# Patient Record
Sex: MALE | Race: WHITE | NOT HISPANIC OR LATINO | Employment: FULL TIME | URBAN - METROPOLITAN AREA
[De-identification: names, ages, dates, MRNs, and addresses within clinical notes are randomized per-mention and may not be internally consistent; named-entity substitution may affect disease eponyms.]

---

## 2024-09-23 ENCOUNTER — OFFICE VISIT (OUTPATIENT)
Dept: FAMILY MEDICINE | Facility: CLINIC | Age: 39
End: 2024-09-23
Payer: COMMERCIAL

## 2024-09-23 VITALS
SYSTOLIC BLOOD PRESSURE: 114 MMHG | WEIGHT: 128.5 LBS | BODY MASS INDEX: 20.65 KG/M2 | TEMPERATURE: 98.3 F | OXYGEN SATURATION: 99 % | HEIGHT: 66 IN | HEART RATE: 66 BPM | DIASTOLIC BLOOD PRESSURE: 80 MMHG | RESPIRATION RATE: 16 BRPM

## 2024-09-23 DIAGNOSIS — K52.9 CHRONIC DIARRHEA: Primary | ICD-10-CM

## 2024-09-23 DIAGNOSIS — M25.561 RIGHT KNEE PAIN, UNSPECIFIED CHRONICITY: ICD-10-CM

## 2024-09-23 LAB
ALBUMIN SERPL BCG-MCNC: 4.6 G/DL (ref 3.5–5.2)
ALP SERPL-CCNC: 83 U/L (ref 40–150)
ALT SERPL W P-5'-P-CCNC: 27 U/L (ref 0–70)
ANION GAP SERPL CALCULATED.3IONS-SCNC: 10 MMOL/L (ref 7–15)
AST SERPL W P-5'-P-CCNC: 25 U/L (ref 0–45)
BILIRUB SERPL-MCNC: 0.8 MG/DL
BUN SERPL-MCNC: 13.9 MG/DL (ref 6–20)
CALCIUM SERPL-MCNC: 9.9 MG/DL (ref 8.8–10.4)
CHLORIDE SERPL-SCNC: 103 MMOL/L (ref 98–107)
CREAT SERPL-MCNC: 0.9 MG/DL (ref 0.67–1.17)
EGFRCR SERPLBLD CKD-EPI 2021: >90 ML/MIN/1.73M2
ERYTHROCYTE [DISTWIDTH] IN BLOOD BY AUTOMATED COUNT: 12.8 % (ref 10–15)
GLUCOSE SERPL-MCNC: 91 MG/DL (ref 70–99)
HCO3 SERPL-SCNC: 29 MMOL/L (ref 22–29)
HCT VFR BLD AUTO: 44.2 % (ref 40–53)
HGB BLD-MCNC: 14.9 G/DL (ref 13.3–17.7)
LIPASE SERPL-CCNC: 29 U/L (ref 13–60)
MCH RBC QN AUTO: 29.2 PG (ref 26.5–33)
MCHC RBC AUTO-ENTMCNC: 33.7 G/DL (ref 31.5–36.5)
MCV RBC AUTO: 87 FL (ref 78–100)
PLATELET # BLD AUTO: 210 10E3/UL (ref 150–450)
POTASSIUM SERPL-SCNC: 4.1 MMOL/L (ref 3.4–5.3)
PROT SERPL-MCNC: 7.8 G/DL (ref 6.4–8.3)
RBC # BLD AUTO: 5.1 10E6/UL (ref 4.4–5.9)
SODIUM SERPL-SCNC: 142 MMOL/L (ref 135–145)
TSH SERPL DL<=0.005 MIU/L-ACNC: 1.42 UIU/ML (ref 0.3–4.2)
WBC # BLD AUTO: 5.2 10E3/UL (ref 4–11)

## 2024-09-23 PROCEDURE — 85027 COMPLETE CBC AUTOMATED: CPT | Performed by: FAMILY MEDICINE

## 2024-09-23 PROCEDURE — 83690 ASSAY OF LIPASE: CPT | Performed by: FAMILY MEDICINE

## 2024-09-23 PROCEDURE — 80053 COMPREHEN METABOLIC PANEL: CPT | Performed by: FAMILY MEDICINE

## 2024-09-23 PROCEDURE — 86364 TISS TRNSGLTMNASE EA IG CLAS: CPT | Performed by: FAMILY MEDICINE

## 2024-09-23 PROCEDURE — 83993 ASSAY FOR CALPROTECTIN FECAL: CPT | Performed by: FAMILY MEDICINE

## 2024-09-23 PROCEDURE — 84443 ASSAY THYROID STIM HORMONE: CPT | Performed by: FAMILY MEDICINE

## 2024-09-23 PROCEDURE — 36415 COLL VENOUS BLD VENIPUNCTURE: CPT | Performed by: FAMILY MEDICINE

## 2024-09-23 PROCEDURE — 99214 OFFICE O/P EST MOD 30 MIN: CPT | Performed by: FAMILY MEDICINE

## 2024-09-23 RX ORDER — DEXTROAMPHETAMINE SACCHARATE, AMPHETAMINE ASPARTATE MONOHYDRATE, DEXTROAMPHETAMINE SULFATE AND AMPHETAMINE SULFATE 3.75; 3.75; 3.75; 3.75 MG/1; MG/1; MG/1; MG/1
15 CAPSULE, EXTENDED RELEASE ORAL DAILY
COMMUNITY
Start: 2024-07-07

## 2024-09-23 ASSESSMENT — PAIN SCALES - GENERAL: PAINLEVEL: NO PAIN (0)

## 2024-09-23 ASSESSMENT — ENCOUNTER SYMPTOMS: DIARRHEA: 1

## 2024-09-23 NOTE — LETTER
September 24, 2024      Isaac Odonnell  [address]  [place] [facility] [address]        Dear ,    We are writing to inform you of your test results.    Isaac,   So far the lab work all looks normal.  I did run the test for gluten intolerance and that is still in the works.  And the only other thought would be to test that stool sample but we should see the gluten test here within the next day or 2.     Resulted Orders   CBC with platelets   Result Value Ref Range    WBC Count 5.2 4.0 - 11.0 10e3/uL    RBC Count 5.10 4.40 - 5.90 10e6/uL    Hemoglobin 14.9 13.3 - 17.7 g/dL    Hematocrit 44.2 40.0 - 53.0 %    MCV 87 78 - 100 fL    MCH 29.2 26.5 - 33.0 pg    MCHC 33.7 31.5 - 36.5 g/dL    RDW 12.8 10.0 - 15.0 %    Platelet Count 210 150 - 450 10e3/uL   TSH with free T4 reflex   Result Value Ref Range    TSH 1.42 0.30 - 4.20 uIU/mL   Comprehensive metabolic panel   Result Value Ref Range    Sodium 142 135 - 145 mmol/L    Potassium 4.1 3.4 - 5.3 mmol/L    Carbon Dioxide (CO2) 29 22 - 29 mmol/L    Anion Gap 10 7 - 15 mmol/L    Urea Nitrogen 13.9 6.0 - 20.0 mg/dL    Creatinine 0.90 0.67 - 1.17 mg/dL    GFR Estimate >90 >60 mL/min/1.73m2      Comment:      eGFR calculated using 2021 CKD-EPI equation.    Calcium 9.9 8.8 - 10.4 mg/dL      Comment:      Reference intervals for this test were updated on 7/16/2024 to reflect our healthy population more accurately. There may be differences in the flagging of prior results with similar values performed with this method. Those prior results can be interpreted in the context of the updated reference intervals.    Chloride 103 98 - 107 mmol/L    Glucose 91 70 - 99 mg/dL    Alkaline Phosphatase 83 40 - 150 U/L    AST 25 0 - 45 U/L    ALT 27 0 - 70 U/L    Protein Total 7.8 6.4 - 8.3 g/dL    Albumin 4.6 3.5 - 5.2 g/dL    Bilirubin Total 0.8 <=1.2 mg/dL   Lipase   Result Value Ref Range    Lipase 29 13 - 60 U/L       If you have any questions or concerns, please call the clinic  at the number listed above.       Sincerely,      Lindsay Thompson MD

## 2024-09-23 NOTE — PROGRESS NOTES
Assessment & Plan     Chronic diarrhea  My first visit with patient previous history reviewed with him and in his chart.  Has had diarrhea more or less for about 6 months without any preceding illness etc.  There are a few times that stool can be a bit more firm but most the time it is either watery or watery mixed with a little bit of firmness.  Not having a lot of abdominal cramping.  No illness symptoms.  No pain per se.  Cannot pinpoint any particular foods that seem to trigger it off.  Family history is significant for father having what sounds like hyperplastic colon polyps but no significant cancer or inflammatory bowel disease history.  Constitutional symptoms stable.  So we discussed taking a look at some potential metabolic causes with some lab work.  Consideration for a fecal calprotectin.  Otherwise this may fit into IBS-D or similar.  I will contact him with results.  - CBC with platelets; Future  - TSH with free T4 reflex; Future  - Comprehensive metabolic panel; Future  - Tissue transglutaminase fabi IgA and IgG; Future  - Lipase; Future  - Calprotectin Feces; Future  - CBC with platelets  - TSH with free T4 reflex  - Comprehensive metabolic panel  - Tissue transglutaminase fabi IgA and IgG  - Lipase  - Calprotectin Feces    Right knee pain, unspecified chronicity  Also notes for a number of months he has had a bit of pain in behind his right knee periodically.  It seems to be a positional issue such as when he crosses his right leg up or under while sitting.  Playing a lot of pickle ball this summer and that does not seem to bother him.  It is a little bit stiff when he first starts to play but then seems to loosen up.  No swelling.  No instability.  Exam unremarkable.  So I think this is more of a generalized stiffness of some of the internal rotational tendons and I recommended some stretching, etc.            See Patient Instructions    Ovidio Gray is a 38 year old, presenting for the  "following health issues:  Diarrhea (Frequent urination/Always feel dehydrated/Right knee/leg pain)        9/23/2024    12:41 PM   Additional Questions   Roomed by Diane Lynne Schoenherr RN     History of Present Illness       Reason for visit:  Right leg issues / prolonged diarrhea  Symptom onset:  More than a month  Symptoms include:  Pain behind right knee/leg when in certain position.  Symptom intensity:  Moderate  Symptom progression:  Staying the same  Had these symptoms before:  No  What makes it worse:  Sitting cross legged or liting up leg  What makes it better:  Stretxhing out leg   He is taking medications regularly.         Diarrhea  Onset/Duration: 4 months  Description:       Consistency of stool: watery and soft, watery varies  occasional solid bm       Blood in stool: No       Number of loose stools past 24 hours: 2  Progression of Symptoms: same  Accompanying signs and symptoms:       Fever: No       Nausea/Vomiting: No       Abdominal pain: No       Weight loss: No       Episodes of constipation: No  History   Ill contacts: No  Recent use of antibiotics: No  Recent travels: No  Recent medication-new or changes(Rx or OTC): Sertraline 1 month ago; back on Adderall after not being on it  Precipitating or alleviating factors: None  Therapies tried and outcome: Imodium right ear tried times once    Here today for symptoms as above.      Review of Systems  Constitutional, HEENT, cardiovascular, pulmonary, gi and gu systems are negative, except as otherwise noted.      Objective    /80 (BP Location: Right arm, Patient Position: Sitting, Cuff Size: Adult Regular)   Pulse 66   Temp 98.3  F (36.8  C) (Oral)   Resp 16   Ht 1.687 m (5' 6.42\")   Wt 58.3 kg (128 lb 8 oz)   SpO2 99%   BMI 20.48 kg/m    Body mass index is 20.48 kg/m .  Physical Exam   Alert, pleasant, upbeat, and in no apparent discomfort.  S1 and S2 normal, no murmurs, clicks, gallops or rubs. Regular rate and rhythm. Chest is clear; " no wheezes or rales. No edema or JVD.  The abdomen is soft without tenderness, guarding, mass, rebound or organomegaly. Bowel sounds are normal. No CVA tenderness or inguinal adenopathy noted.  Knees reveal full range of motion, no tenderness, masses, effusion or ligamentous instability.   Past labs reviewed with the patient.             Signed Electronically by: Lindsay Thompson MD

## 2024-09-23 NOTE — LETTER
September 24, 2024      Isaac Odonnell  [address]  [place] [facility] [address]        Dear ,    We are writing to inform you of your test results.    Well the gluten test was normal as well.  So I suppose we could consider doing that stool test looking for inflammatory bowel disorders, but I do not know how high yield that is necessarily.  Another thought would be perhaps meeting with GI specialist in consultation just to see what their thoughts are.  Maybe even just like a video visit or something.  So give it some thought and let me know what you think.     Resulted Orders   CBC with platelets   Result Value Ref Range    WBC Count 5.2 4.0 - 11.0 10e3/uL    RBC Count 5.10 4.40 - 5.90 10e6/uL    Hemoglobin 14.9 13.3 - 17.7 g/dL    Hematocrit 44.2 40.0 - 53.0 %    MCV 87 78 - 100 fL    MCH 29.2 26.5 - 33.0 pg    MCHC 33.7 31.5 - 36.5 g/dL    RDW 12.8 10.0 - 15.0 %    Platelet Count 210 150 - 450 10e3/uL   TSH with free T4 reflex   Result Value Ref Range    TSH 1.42 0.30 - 4.20 uIU/mL   Comprehensive metabolic panel   Result Value Ref Range    Sodium 142 135 - 145 mmol/L    Potassium 4.1 3.4 - 5.3 mmol/L    Carbon Dioxide (CO2) 29 22 - 29 mmol/L    Anion Gap 10 7 - 15 mmol/L    Urea Nitrogen 13.9 6.0 - 20.0 mg/dL    Creatinine 0.90 0.67 - 1.17 mg/dL    GFR Estimate >90 >60 mL/min/1.73m2      Comment:      eGFR calculated using 2021 CKD-EPI equation.    Calcium 9.9 8.8 - 10.4 mg/dL      Comment:      Reference intervals for this test were updated on 7/16/2024 to reflect our healthy population more accurately. There may be differences in the flagging of prior results with similar values performed with this method. Those prior results can be interpreted in the context of the updated reference intervals.    Chloride 103 98 - 107 mmol/L    Glucose 91 70 - 99 mg/dL    Alkaline Phosphatase 83 40 - 150 U/L    AST 25 0 - 45 U/L    ALT 27 0 - 70 U/L    Protein Total 7.8 6.4 - 8.3 g/dL    Albumin 4.6 3.5 - 5.2 g/dL     Bilirubin Total 0.8 <=1.2 mg/dL   Tissue transglutaminase fabi IgA and IgG   Result Value Ref Range    Tissue Transglutaminase Antibody IgA 0.6 <7.0 U/mL      Comment:      Negative- The tTG-IgA assay has limited utility for patients with decreased levels of IgA. Screening for celiac disease should include IgA testing to rule out selective IgA deficiency and to guide selection and interpretation of serological testing. tTG-IgG testing may be positive in celiac disease patients with IgA deficiency.    Tissue Transglutaminase Antibody IgG 0.6 <7.0 U/mL      Comment:      Negative   Lipase   Result Value Ref Range    Lipase 29 13 - 60 U/L       If you have any questions or concerns, please call the clinic at the number listed above.       Sincerely,      Lindsay Thompson MD

## 2024-09-24 LAB
TTG IGA SER-ACNC: 0.6 U/ML
TTG IGG SER-ACNC: 0.6 U/ML

## 2024-09-24 NOTE — RESULT ENCOUNTER NOTE
Isaac,  Well the gluten test was normal as well.  So I suppose we could consider doing that stool test looking for inflammatory bowel disorders, but I do not know how high yield that is necessarily.  Another thought would be perhaps meeting with GI specialist in consultation just to see what their thoughts are.  Maybe even just like a video visit or something.  So give it some thought and let me know what you think.  TRINH Thompson M.D.

## 2024-09-24 NOTE — RESULT ENCOUNTER NOTE
Isaac,  So far the lab work all looks normal.  I did run the test for gluten intolerance and that is still in the works.  And the only other thought would be to test that stool sample but we should see the gluten test here within the next day or 2.  TRINH Thompson M.D.

## 2024-09-25 LAB — CALPROTECTIN STL-MCNT: 11.1 MG/KG (ref 0–49.9)

## 2024-10-06 ENCOUNTER — HEALTH MAINTENANCE LETTER (OUTPATIENT)
Age: 39
End: 2024-10-06

## 2024-12-05 ENCOUNTER — MYC MEDICAL ADVICE (OUTPATIENT)
Dept: FAMILY MEDICINE | Facility: CLINIC | Age: 39
End: 2024-12-05
Payer: COMMERCIAL

## 2025-07-15 ENCOUNTER — E-VISIT (OUTPATIENT)
Dept: FAMILY MEDICINE | Facility: CLINIC | Age: 40
End: 2025-07-15
Payer: COMMERCIAL

## 2025-07-15 ENCOUNTER — LAB (OUTPATIENT)
Dept: LAB | Facility: CLINIC | Age: 40
End: 2025-07-15
Payer: COMMERCIAL

## 2025-07-15 DIAGNOSIS — J02.9 SORE THROAT: Primary | ICD-10-CM

## 2025-07-15 DIAGNOSIS — J02.9 SORE THROAT: ICD-10-CM

## 2025-07-15 LAB
DEPRECATED S PYO AG THROAT QL EIA: NEGATIVE
S PYO DNA THROAT QL NAA+PROBE: DETECTED

## 2025-07-15 PROCEDURE — 87651 STREP A DNA AMP PROBE: CPT

## 2025-07-15 RX ORDER — AMOXICILLIN 875 MG/1
875 TABLET, COATED ORAL 2 TIMES DAILY
Qty: 14 TABLET | Refills: 0 | Status: SHIPPED | OUTPATIENT
Start: 2025-07-15 | End: 2025-07-22